# Patient Record
Sex: FEMALE
[De-identification: names, ages, dates, MRNs, and addresses within clinical notes are randomized per-mention and may not be internally consistent; named-entity substitution may affect disease eponyms.]

---

## 2017-08-10 ENCOUNTER — HOSPITAL ENCOUNTER (OUTPATIENT)
Dept: HOSPITAL 42 - SDSVAS | Age: 67
Discharge: HOME | End: 2017-08-10
Attending: RADIOLOGY
Payer: MEDICARE

## 2017-08-10 VITALS — BODY MASS INDEX: 22.3 KG/M2

## 2017-08-10 VITALS
TEMPERATURE: 97.9 F | HEART RATE: 56 BPM | SYSTOLIC BLOOD PRESSURE: 122 MMHG | RESPIRATION RATE: 20 BRPM | DIASTOLIC BLOOD PRESSURE: 77 MMHG

## 2017-08-10 VITALS — OXYGEN SATURATION: 100 %

## 2017-08-10 DIAGNOSIS — C50.912: Primary | ICD-10-CM

## 2017-08-10 LAB
APTT BLD: 24.8 SECONDS (ref 23.7–30.8)
BASOPHILS # BLD AUTO: 0.03 K/MM3 (ref 0–2)
BASOPHILS NFR BLD: 0.7 % (ref 0–3)
BUN SERPL-MCNC: 16 MG/DL (ref 7–21)
CALCIUM SERPL-MCNC: 9.7 MG/DL (ref 8.4–10.5)
EOSINOPHIL # BLD: 0.1 10*3/UL (ref 0–0.7)
EOSINOPHIL NFR BLD: 3.2 % (ref 1.5–5)
ERYTHROCYTE [DISTWIDTH] IN BLOOD BY AUTOMATED COUNT: 13 % (ref 11.5–14.5)
GFR NON-AFRICAN AMERICAN: > 60
GRANULOCYTES # BLD: 2.28 10*3/UL (ref 1.4–6.5)
GRANULOCYTES NFR BLD: 57 % (ref 50–68)
HGB BLD-MCNC: 13.1 G/DL (ref 12–16)
INR PPP: 0.96 (ref 0.93–1.08)
LYMPHOCYTES # BLD: 1.2 10*3/UL (ref 1.2–3.4)
LYMPHOCYTES NFR BLD AUTO: 30.4 % (ref 22–35)
MCH RBC QN AUTO: 29.8 PG (ref 25–35)
MCHC RBC AUTO-ENTMCNC: 33.9 G/DL (ref 31–37)
MCV RBC AUTO: 87.7 FL (ref 80–105)
MONOCYTES # BLD AUTO: 0.4 10*3/UL (ref 0.1–0.6)
MONOCYTES NFR BLD: 8.7 % (ref 1–6)
PLATELET # BLD: 294 10^3/UL (ref 120–450)
PMV BLD AUTO: 9.2 FL (ref 7–11)
PROTHROMBIN TIME: 10.4 SECONDS (ref 9.9–11.8)
RBC # BLD AUTO: 4.4 10^6/UL (ref 3.5–6.1)
WBC # BLD AUTO: 4 10^3/UL (ref 4.5–11)

## 2017-08-10 PROCEDURE — 85610 PROTHROMBIN TIME: CPT

## 2017-08-10 PROCEDURE — 99152 MOD SED SAME PHYS/QHP 5/>YRS: CPT

## 2017-08-10 PROCEDURE — 36561 INSERT TUNNELED CV CATH: CPT

## 2017-08-10 PROCEDURE — 77001 FLUOROGUIDE FOR VEIN DEVICE: CPT

## 2017-08-10 PROCEDURE — 85730 THROMBOPLASTIN TIME PARTIAL: CPT

## 2017-08-10 PROCEDURE — 36415 COLL VENOUS BLD VENIPUNCTURE: CPT

## 2017-08-10 PROCEDURE — 85025 COMPLETE CBC W/AUTO DIFF WBC: CPT

## 2017-08-10 PROCEDURE — 99153 MOD SED SAME PHYS/QHP EA: CPT

## 2017-08-10 PROCEDURE — 80048 BASIC METABOLIC PNL TOTAL CA: CPT

## 2017-08-10 PROCEDURE — 76937 US GUIDE VASCULAR ACCESS: CPT

## 2017-08-10 NOTE — CP.SDSHP
Same Day [facility]





- History


Proposed Procedure: Insertion of venous port


Pre-Op Diagnosis: Breast Ca





- Previous Medical/Surgical History


Misc: Other (L breast ca,diagnosed in April this year)


Pain: 0. No Pain (Pain in the L breast is described as a soreness,on a scale of 

1 to 10 it is a 2.)


Previous Surgical History: Colonoscopy.  L breast biopsy.  L breast lumpectomy.

  Lymph node dissection.  Hysterectomy(Endometriosis/Fibroids)1997





- Allergies


Allergies: 


Allergies





No Known Allergies Allergy (Verified 08/09/17 09:10)


 











- Physical Exam


General Appearance: Thin built female


Vital Signs: 


 Vital Signs











  08/10/17





  13:30


 


Temperature 98.8 F


 


Pulse Rate 59 L


 


Respiratory 18





Rate 


 


Blood Pressure 121/66


 


O2 Sat by Pulse 100





Oximetry 











Mental Status: Alert & Oriented x3


Neuro: WNL


Heart: WNL


Lungs: WNL





- {Optional Preform as Required}


Breast: Other (Surgical scar noted on the L breast .Minmal tenderness noted on 

palpation.No axillary adenopathy.)


Abdomen: WNL





- Impression


Impression: Breast Ca





Short Stay Discharge





- Short Stay Discharge


Admitting Diagnosis/Reason for Visit: BREAST CA


Disposition: HOME/ ROUTINE


Referrals: 


Meng Hargrove MD [Primary Care Provider] -

## 2017-08-10 NOTE — VASCULAR
PROCEDURE:  Ultrasound and fluoroscopic right internal jugular venous 

access port.



CLINICAL HISTORY:  Breast carcinoma.Venous port for chemotherapy.



PHYSICIAN(S):  Kolby Buitrago M.D.



TECHNIQUE:

The relative risks and indications of the procedure were explained to 

the patient and consent obtained. The patient was placed supine on 

the arteriogram table and the right neck and chest prepped and draped 

in the usual sterile fashion. Conscious sedation monitoring was 

provided throughout the procedure by a nurse. Antibiotics were given 

prior to the procedure.



Under direct ultrasound guidance, the right internal jugular vein was 

punctured with a micro-puncture set. A 0.035 angled Glidewire was 

advanced into the IVC. A 4 cm incision was made below the right 

clavicle and the pocket blunted dissected. A 8 Portuguese single-lumen 

catheter, 23 cm long, was advanced to the SVC/RA junction. The 

catheter was trimmed and attached to the port. The port aspirates and 

injects easily. The port was placed in the pocket and closed in 2 

layers. The patient tolerated the procedure well.



IMPRESSION:

Ultrasound and fluoroscopically placed right internal jugular venous 

access port.

## 2018-02-15 ENCOUNTER — HOSPITAL ENCOUNTER (OUTPATIENT)
Dept: HOSPITAL 42 - SDS | Age: 68
Discharge: HOME | End: 2018-02-15
Attending: RADIOLOGY
Payer: MEDICARE

## 2018-02-15 VITALS
HEART RATE: 80 BPM | OXYGEN SATURATION: 98 % | SYSTOLIC BLOOD PRESSURE: 113 MMHG | TEMPERATURE: 98.8 F | DIASTOLIC BLOOD PRESSURE: 76 MMHG

## 2018-02-15 VITALS — RESPIRATION RATE: 18 BRPM

## 2018-02-15 VITALS — BODY MASS INDEX: 23 KG/M2

## 2018-02-15 DIAGNOSIS — Z85.3: ICD-10-CM

## 2018-02-15 DIAGNOSIS — Z92.21: ICD-10-CM

## 2018-02-15 DIAGNOSIS — Z45.2: Primary | ICD-10-CM

## 2018-02-15 LAB
ALBUMIN SERPL-MCNC: 4 G/DL (ref 3–4.8)
ALBUMIN/GLOB SERPL: 1.5 {RATIO} (ref 1.1–1.8)
ALT SERPL-CCNC: 45 U/L (ref 7–56)
APTT BLD: 27.7 SECONDS (ref 25.1–36.5)
AST SERPL-CCNC: 36 U/L (ref 14–36)
BASOPHILS # BLD AUTO: 0.02 K/MM3 (ref 0–2)
BASOPHILS NFR BLD: 0.4 % (ref 0–3)
BUN SERPL-MCNC: 17 MG/DL (ref 7–21)
CALCIUM SERPL-MCNC: 9.3 MG/DL (ref 8.4–10.5)
EOSINOPHIL # BLD: 0.3 10*3/UL (ref 0–0.7)
EOSINOPHIL NFR BLD: 5.4 % (ref 1.5–5)
ERYTHROCYTE [DISTWIDTH] IN BLOOD BY AUTOMATED COUNT: 14.3 % (ref 11.5–14.5)
GFR NON-AFRICAN AMERICAN: > 60
GRANULOCYTES # BLD: 3.02 10*3/UL (ref 1.4–6.5)
GRANULOCYTES NFR BLD: 65.3 % (ref 50–68)
HGB BLD-MCNC: 11.4 G/DL (ref 12–16)
INR PPP: 0.94 (ref 0.93–1.08)
LYMPHOCYTES # BLD: 1 10*3/UL (ref 1.2–3.4)
LYMPHOCYTES NFR BLD AUTO: 20.7 % (ref 22–35)
MCH RBC QN AUTO: 28.9 PG (ref 25–35)
MCHC RBC AUTO-ENTMCNC: 32.1 G/DL (ref 31–37)
MCV RBC AUTO: 90.1 FL (ref 80–105)
MONOCYTES # BLD AUTO: 0.4 10*3/UL (ref 0.1–0.6)
MONOCYTES NFR BLD: 8.2 % (ref 1–6)
PLATELET # BLD: 317 10^3/UL (ref 120–450)
PMV BLD AUTO: 8.8 FL (ref 7–11)
PROTHROMBIN TIME: 10.8 SECONDS (ref 9.4–12.5)
RBC # BLD AUTO: 3.94 10^6/UL (ref 3.5–6.1)
WBC # BLD AUTO: 4.6 10^3/UL (ref 4.5–11)

## 2018-02-15 PROCEDURE — 36590 REMOVAL TUNNELED CV CATH: CPT

## 2018-02-15 PROCEDURE — 85025 COMPLETE CBC W/AUTO DIFF WBC: CPT

## 2018-02-15 PROCEDURE — 80053 COMPREHEN METABOLIC PANEL: CPT

## 2018-02-15 PROCEDURE — 36415 COLL VENOUS BLD VENIPUNCTURE: CPT

## 2018-02-15 PROCEDURE — 85730 THROMBOPLASTIN TIME PARTIAL: CPT

## 2018-02-15 PROCEDURE — 99152 MOD SED SAME PHYS/QHP 5/>YRS: CPT

## 2018-02-15 PROCEDURE — 85610 PROTHROMBIN TIME: CPT

## 2018-02-15 NOTE — VASCULAR
PROCEDURE:  Removal of tunneled right internal jugular venous access 

port.



CLINICAL HISTORY:  Breast carcinoma. Completed chemotherapy.



PHYSICIAN(S):  Kolby Buitrago M.D.



TECHNIQUE:

The relative risks and indications of the procedure were explained to 

the patient and consent obtained. The patient was placed supine on 

the arteriogram table and the right neck and chest prepped and draped 

usual sterile fashion. Conscious sedation and monitoring were 

provided throughout the procedure by a nurse.



1% Xylocaine was used to anesthetize the skin and soft tissues at the 

port. A 4 cm incision was made. The port was bluntly dissected and 

removed. The catheter was removed under fluoroscopic guidance. No 

retained catheter fragments were seen. The pocket was lavaged with 

normal saline. The pocket was closed in 2 layers. The patient 

tolerated the procedure well.



IMPRESSION:

1. Removal of tunneled right internal jugular venous access port.